# Patient Record
Sex: FEMALE | Race: WHITE | URBAN - NONMETROPOLITAN AREA
[De-identification: names, ages, dates, MRNs, and addresses within clinical notes are randomized per-mention and may not be internally consistent; named-entity substitution may affect disease eponyms.]

---

## 2022-05-03 ENCOUNTER — WEB ENCOUNTER (OUTPATIENT)
Dept: URBAN - NONMETROPOLITAN AREA CLINIC 13 | Facility: CLINIC | Age: 52
End: 2022-05-03

## 2022-05-05 ENCOUNTER — LAB OUTSIDE AN ENCOUNTER (OUTPATIENT)
Dept: URBAN - NONMETROPOLITAN AREA CLINIC 13 | Facility: CLINIC | Age: 52
End: 2022-05-05

## 2022-05-05 ENCOUNTER — OFFICE VISIT (OUTPATIENT)
Dept: URBAN - NONMETROPOLITAN AREA CLINIC 13 | Facility: CLINIC | Age: 52
End: 2022-05-05
Payer: COMMERCIAL

## 2022-05-05 ENCOUNTER — WEB ENCOUNTER (OUTPATIENT)
Dept: URBAN - NONMETROPOLITAN AREA CLINIC 13 | Facility: CLINIC | Age: 52
End: 2022-05-05

## 2022-05-05 VITALS
HEART RATE: 82 BPM | WEIGHT: 134.8 LBS | SYSTOLIC BLOOD PRESSURE: 110 MMHG | HEIGHT: 65 IN | DIASTOLIC BLOOD PRESSURE: 75 MMHG | BODY MASS INDEX: 22.46 KG/M2

## 2022-05-05 DIAGNOSIS — Z12.11 COLON CANCER SCREENING: ICD-10-CM

## 2022-05-05 DIAGNOSIS — R10.13 EPIGASTRIC PAIN: ICD-10-CM

## 2022-05-05 DIAGNOSIS — K59.04 CHRONIC IDIOPATHIC CONSTIPATION: ICD-10-CM

## 2022-05-05 PROCEDURE — 99204 OFFICE O/P NEW MOD 45 MIN: CPT | Performed by: NURSE PRACTITIONER

## 2022-05-05 RX ORDER — ESOMEPRAZOLE MAGNESIUM 40 MG/1
TAKE ONE CAPSULE BY MOUTH ONE TIME DAILY CAPSULE, DELAYED RELEASE ORAL
Qty: 30 | Refills: 0 | Status: ACTIVE | COMMUNITY

## 2022-05-05 RX ORDER — FAMOTIDINE 40 MG/1
1 TABLET 30MINS PRIOR TO MEALS TABLET, FILM COATED ORAL TWICE A DAY
Qty: 180 TABLET | Refills: 3 | OUTPATIENT
Start: 2022-05-05

## 2022-05-05 NOTE — HPI-TODAY'S VISIT:
5/5/2022 Ms. Emilie Pritchard is a 52 year old female here for epigastric pain and colon cancer screening. She has been having epigastric discomfort for several months. She took some nexium and this did help but it caused leg swelling so she stopped it. She has noticed that spicy and rich foods make it worse. She has been eating a bland diet and seeing a nutritiontist. She has also had some minor constipation since changing her diet and instructed by the nutritionist to use magnesium. She has never had a colonoscopy. She denies any blood in her stool or melena. CS

## 2022-08-23 ENCOUNTER — OFFICE VISIT (OUTPATIENT)
Dept: URBAN - NONMETROPOLITAN AREA SURGERY CENTER 1 | Facility: SURGERY CENTER | Age: 52
End: 2022-08-23
Payer: COMMERCIAL

## 2022-08-23 ENCOUNTER — CLAIMS CREATED FROM THE CLAIM WINDOW (OUTPATIENT)
Dept: URBAN - METROPOLITAN AREA CLINIC 4 | Facility: CLINIC | Age: 52
End: 2022-08-23
Payer: COMMERCIAL

## 2022-08-23 DIAGNOSIS — K31.89 ACQUIRED DEFORMITY OF DUODENUM: ICD-10-CM

## 2022-08-23 DIAGNOSIS — R10.13 ABDOMINAL DISCOMFORT, EPIGASTRIC: ICD-10-CM

## 2022-08-23 DIAGNOSIS — K29.70 GASTRITIS, UNSPECIFIED, WITHOUT BLEEDING: ICD-10-CM

## 2022-08-23 DIAGNOSIS — K31.89 OTHER DISEASES OF STOMACH AND DUODENUM: ICD-10-CM

## 2022-08-23 DIAGNOSIS — Z12.11 COLON CANCER SCREENING: ICD-10-CM

## 2022-08-23 PROCEDURE — 88312 SPECIAL STAINS GROUP 1: CPT | Performed by: PATHOLOGY

## 2022-08-23 PROCEDURE — 43239 EGD BIOPSY SINGLE/MULTIPLE: CPT | Performed by: INTERNAL MEDICINE

## 2022-08-23 PROCEDURE — G0121 COLON CA SCRN NOT HI RSK IND: HCPCS | Performed by: INTERNAL MEDICINE

## 2022-08-23 PROCEDURE — G8907 PT DOC NO EVENTS ON DISCHARG: HCPCS | Performed by: INTERNAL MEDICINE

## 2022-08-23 PROCEDURE — 88305 TISSUE EXAM BY PATHOLOGIST: CPT | Performed by: PATHOLOGY

## 2022-09-13 ENCOUNTER — OFFICE VISIT (OUTPATIENT)
Dept: URBAN - NONMETROPOLITAN AREA CLINIC 13 | Facility: CLINIC | Age: 52
End: 2022-09-13
Payer: COMMERCIAL

## 2022-09-13 ENCOUNTER — DASHBOARD ENCOUNTERS (OUTPATIENT)
Age: 52
End: 2022-09-13

## 2022-09-13 VITALS
DIASTOLIC BLOOD PRESSURE: 72 MMHG | BODY MASS INDEX: 22.59 KG/M2 | WEIGHT: 135.6 LBS | HEIGHT: 65 IN | HEART RATE: 73 BPM | SYSTOLIC BLOOD PRESSURE: 106 MMHG

## 2022-09-13 DIAGNOSIS — R10.13 EPIGASTRIC PAIN: ICD-10-CM

## 2022-09-13 DIAGNOSIS — K59.04 CHRONIC IDIOPATHIC CONSTIPATION: ICD-10-CM

## 2022-09-13 DIAGNOSIS — Z12.11 COLON CANCER SCREENING: ICD-10-CM

## 2022-09-13 PROBLEM — 82934008: Status: ACTIVE | Noted: 2022-05-05

## 2022-09-13 PROCEDURE — 99213 OFFICE O/P EST LOW 20 MIN: CPT | Performed by: NURSE PRACTITIONER

## 2022-09-13 RX ORDER — FAMOTIDINE 40 MG/1
1 TABLET 30MINS PRIOR TO MEALS TABLET, FILM COATED ORAL TWICE A DAY
Qty: 180 TABLET | Refills: 3 | OUTPATIENT

## 2022-09-13 RX ORDER — ESOMEPRAZOLE MAGNESIUM 40 MG/1
TAKE ONE CAPSULE BY MOUTH ONE TIME DAILY CAPSULE, DELAYED RELEASE ORAL
Qty: 30 | Refills: 0 | Status: ACTIVE | COMMUNITY

## 2022-09-13 RX ORDER — FAMOTIDINE 40 MG/1
1 TABLET 30MINS PRIOR TO MEALS TABLET, FILM COATED ORAL TWICE A DAY
Qty: 180 TABLET | Refills: 3 | Status: ACTIVE | COMMUNITY
Start: 2022-05-05

## 2022-09-13 NOTE — HPI-TODAY'S VISIT:
5/5/2022 Ms. Emilie Pritchard is here for f/u of epigastric pain and colon cancer screening. She was seen in May after several months of epigastric discomfort despite diet changes. She was unable to tolerate nexium due to swelling. Rich and spicy foods made it worse. She was given pepcid BID and had an EGD with gastritis. Today, the pain is no longer constant. She is no longer taking the pepcid. Her bowels are now normal. Overal, she is feeling better. CS

## 2022-09-13 NOTE — PHYSICAL EXAM EYES:
Conjuntivae and eyelids appear normal, Sclerae : White without injection The patient is a 81y Female complaining of abnormal lab result.

## 2022-09-13 NOTE — HPI-OTHER HISTORIES
5/5/2022 Ms. Emilie Pritchard is a 52 year old female here for epigastric pain and colon cancer screening. She has been having epigastric discomfort for several months. She took some nexium and this did help but it caused leg swelling so she stopped it. She has noticed that spicy and rich foods make it worse. She has been eating a bland diet and seeing a nutritiontist. She has also had some minor constipation since changing her diet and instructed by the nutritionist to use magnesium. She has never had a colonoscopy. She denies any blood in her stool or melena.   8/23/2022 Colonoscopy: normal EGD: smal hiatal hernia, gastritis. Path negative for celiac and hpylori

## 2023-06-15 NOTE — PHYSICAL EXAM NECK/THYROID:
Health Maintenance Due   Topic Date Due    Pneumococcal Vaccines (Age 0-64) (1 - PCV) Never done    Colorectal Cancer Screening  Never done    COVID-19 Vaccine (4 - Pfizer series) 12/31/2021    Eye Exam  01/05/2023        Chart review done.    updated.   Immunizations reviewed & updated.   Care Everywhere updated.    normal appearance